# Patient Record
Sex: FEMALE | Race: WHITE | NOT HISPANIC OR LATINO
[De-identification: names, ages, dates, MRNs, and addresses within clinical notes are randomized per-mention and may not be internally consistent; named-entity substitution may affect disease eponyms.]

---

## 2017-01-13 ENCOUNTER — APPOINTMENT (OUTPATIENT)
Dept: NEUROLOGY | Facility: CLINIC | Age: 27
End: 2017-01-13

## 2017-01-20 ENCOUNTER — APPOINTMENT (OUTPATIENT)
Dept: NEUROLOGY | Facility: CLINIC | Age: 27
End: 2017-01-20

## 2017-04-05 ENCOUNTER — OUTPATIENT (OUTPATIENT)
Dept: OUTPATIENT SERVICES | Facility: HOSPITAL | Age: 27
LOS: 1 days | End: 2017-04-05
Payer: COMMERCIAL

## 2017-04-05 ENCOUNTER — APPOINTMENT (OUTPATIENT)
Dept: CV DIAGNOSITCS | Facility: HOSPITAL | Age: 27
End: 2017-04-05

## 2017-04-05 DIAGNOSIS — I10 ESSENTIAL (PRIMARY) HYPERTENSION: ICD-10-CM

## 2017-04-05 PROCEDURE — 93306 TTE W/DOPPLER COMPLETE: CPT

## 2017-04-05 PROCEDURE — 93306 TTE W/DOPPLER COMPLETE: CPT | Mod: 26

## 2017-04-10 ENCOUNTER — APPOINTMENT (OUTPATIENT)
Dept: CARDIOLOGY | Facility: CLINIC | Age: 27
End: 2017-04-10

## 2017-04-10 ENCOUNTER — NON-APPOINTMENT (OUTPATIENT)
Age: 27
End: 2017-04-10

## 2017-04-10 VITALS
HEART RATE: 97 BPM | DIASTOLIC BLOOD PRESSURE: 86 MMHG | SYSTOLIC BLOOD PRESSURE: 127 MMHG | WEIGHT: 169 LBS | HEIGHT: 67.5 IN | OXYGEN SATURATION: 98 % | BODY MASS INDEX: 26.22 KG/M2

## 2017-04-10 VITALS — DIASTOLIC BLOOD PRESSURE: 73 MMHG | OXYGEN SATURATION: 99 % | HEART RATE: 98 BPM | SYSTOLIC BLOOD PRESSURE: 105 MMHG

## 2017-04-10 DIAGNOSIS — M35.3 POLYMYALGIA RHEUMATICA: ICD-10-CM

## 2017-04-10 DIAGNOSIS — F41.8 OTHER SPECIFIED ANXIETY DISORDERS: ICD-10-CM

## 2017-04-10 DIAGNOSIS — Z87.891 PERSONAL HISTORY OF NICOTINE DEPENDENCE: ICD-10-CM

## 2017-04-10 DIAGNOSIS — A69.20 LYME DISEASE, UNSPECIFIED: ICD-10-CM

## 2017-04-10 DIAGNOSIS — Z00.00 ENCOUNTER FOR GENERAL ADULT MEDICAL EXAMINATION W/OUT ABNORMAL FINDINGS: ICD-10-CM

## 2017-04-10 DIAGNOSIS — R07.9 CHEST PAIN, UNSPECIFIED: ICD-10-CM

## 2017-04-10 RX ORDER — ADHESIVE TAPE 3"X 2.3 YD
50 MCG TAPE, NON-MEDICATED TOPICAL DAILY
Refills: 0 | Status: ACTIVE | COMMUNITY
Start: 2017-04-10

## 2017-04-10 RX ORDER — UBIDECARENONE 200 MG
200 CAPSULE ORAL
Refills: 0 | Status: ACTIVE | COMMUNITY
Start: 2017-04-10

## 2017-06-19 ENCOUNTER — EMERGENCY (EMERGENCY)
Facility: HOSPITAL | Age: 27
LOS: 1 days | Discharge: HOME | End: 2017-06-19
Attending: EMERGENCY MEDICINE | Admitting: EMERGENCY MEDICINE
Payer: COMMERCIAL

## 2017-06-19 VITALS
DIASTOLIC BLOOD PRESSURE: 81 MMHG | SYSTOLIC BLOOD PRESSURE: 127 MMHG | RESPIRATION RATE: 16 BRPM | TEMPERATURE: 98 F | HEART RATE: 70 BPM | OXYGEN SATURATION: 99 %

## 2017-06-19 VITALS
OXYGEN SATURATION: 100 % | HEART RATE: 120 BPM | DIASTOLIC BLOOD PRESSURE: 73 MMHG | SYSTOLIC BLOOD PRESSURE: 129 MMHG | TEMPERATURE: 98 F

## 2017-06-19 LAB
ALBUMIN SERPL ELPH-MCNC: 4.6 G/DL — SIGNIFICANT CHANGE UP (ref 3.3–5)
ALP SERPL-CCNC: 59 U/L — SIGNIFICANT CHANGE UP (ref 40–120)
ALT FLD-CCNC: 27 U/L RC — SIGNIFICANT CHANGE UP (ref 10–45)
ANION GAP SERPL CALC-SCNC: 18 MMOL/L — HIGH (ref 5–17)
APTT BLD: 22.5 SEC — LOW (ref 27.5–37.4)
AST SERPL-CCNC: 34 U/L — SIGNIFICANT CHANGE UP (ref 10–40)
BASOPHILS # BLD AUTO: 0 K/UL — SIGNIFICANT CHANGE UP (ref 0–0.2)
BASOPHILS NFR BLD AUTO: 0.3 % — SIGNIFICANT CHANGE UP (ref 0–2)
BILIRUB SERPL-MCNC: 0.7 MG/DL — SIGNIFICANT CHANGE UP (ref 0.2–1.2)
BUN SERPL-MCNC: 9 MG/DL — SIGNIFICANT CHANGE UP (ref 7–23)
CALCIUM SERPL-MCNC: 9.4 MG/DL — SIGNIFICANT CHANGE UP (ref 8.4–10.5)
CHLORIDE SERPL-SCNC: 99 MMOL/L — SIGNIFICANT CHANGE UP (ref 96–108)
CO2 SERPL-SCNC: 21 MMOL/L — LOW (ref 22–31)
CREAT SERPL-MCNC: 0.78 MG/DL — SIGNIFICANT CHANGE UP (ref 0.5–1.3)
D DIMER BLD IA.RAPID-MCNC: 656 NG/ML DDU — HIGH
EOSINOPHIL # BLD AUTO: 0.5 K/UL — SIGNIFICANT CHANGE UP (ref 0–0.5)
EOSINOPHIL NFR BLD AUTO: 4.5 % — SIGNIFICANT CHANGE UP (ref 0–6)
GLUCOSE SERPL-MCNC: 91 MG/DL — SIGNIFICANT CHANGE UP (ref 70–99)
HCG SERPL-ACNC: <2 MIU/ML — SIGNIFICANT CHANGE UP
HCT VFR BLD CALC: 41.5 % — SIGNIFICANT CHANGE UP (ref 34.5–45)
HGB BLD-MCNC: 13.9 G/DL — SIGNIFICANT CHANGE UP (ref 11.5–15.5)
INR BLD: 1.04 RATIO — SIGNIFICANT CHANGE UP (ref 0.88–1.16)
LYMPHOCYTES # BLD AUTO: 3.6 K/UL — HIGH (ref 1–3.3)
LYMPHOCYTES # BLD AUTO: 34.4 % — SIGNIFICANT CHANGE UP (ref 13–44)
MAGNESIUM SERPL-MCNC: 2.1 MG/DL — SIGNIFICANT CHANGE UP (ref 1.6–2.6)
MCHC RBC-ENTMCNC: 31.1 PG — SIGNIFICANT CHANGE UP (ref 27–34)
MCHC RBC-ENTMCNC: 33.5 GM/DL — SIGNIFICANT CHANGE UP (ref 32–36)
MCV RBC AUTO: 92.9 FL — SIGNIFICANT CHANGE UP (ref 80–100)
MONOCYTES # BLD AUTO: 1 K/UL — HIGH (ref 0–0.9)
MONOCYTES NFR BLD AUTO: 9.2 % — SIGNIFICANT CHANGE UP (ref 2–14)
NEUTROPHILS # BLD AUTO: 5.4 K/UL — SIGNIFICANT CHANGE UP (ref 1.8–7.4)
NEUTROPHILS NFR BLD AUTO: 51.6 % — SIGNIFICANT CHANGE UP (ref 43–77)
PHOSPHATE SERPL-MCNC: 2.8 MG/DL — SIGNIFICANT CHANGE UP (ref 2.5–4.5)
PLATELET # BLD AUTO: 285 K/UL — SIGNIFICANT CHANGE UP (ref 150–400)
POTASSIUM SERPL-MCNC: 4.1 MMOL/L — SIGNIFICANT CHANGE UP (ref 3.5–5.3)
POTASSIUM SERPL-SCNC: 4.1 MMOL/L — SIGNIFICANT CHANGE UP (ref 3.5–5.3)
PROT SERPL-MCNC: 8 G/DL — SIGNIFICANT CHANGE UP (ref 6–8.3)
PROTHROM AB SERPL-ACNC: 11.2 SEC — SIGNIFICANT CHANGE UP (ref 9.8–12.7)
RBC # BLD: 4.47 M/UL — SIGNIFICANT CHANGE UP (ref 3.8–5.2)
RBC # FLD: 11.9 % — SIGNIFICANT CHANGE UP (ref 10.3–14.5)
SODIUM SERPL-SCNC: 138 MMOL/L — SIGNIFICANT CHANGE UP (ref 135–145)
TROPONIN T SERPL-MCNC: <0.01 NG/ML — SIGNIFICANT CHANGE UP (ref 0–0.06)
TSH SERPL-MCNC: 3.48 UIU/ML — SIGNIFICANT CHANGE UP (ref 0.27–4.2)
WBC # BLD: 10.6 K/UL — HIGH (ref 3.8–10.5)
WBC # FLD AUTO: 10.6 K/UL — HIGH (ref 3.8–10.5)

## 2017-06-19 PROCEDURE — 82550 ASSAY OF CK (CPK): CPT

## 2017-06-19 PROCEDURE — 80053 COMPREHEN METABOLIC PANEL: CPT

## 2017-06-19 PROCEDURE — 83735 ASSAY OF MAGNESIUM: CPT

## 2017-06-19 PROCEDURE — 71275 CT ANGIOGRAPHY CHEST: CPT

## 2017-06-19 PROCEDURE — 71046 X-RAY EXAM CHEST 2 VIEWS: CPT

## 2017-06-19 PROCEDURE — 84702 CHORIONIC GONADOTROPIN TEST: CPT

## 2017-06-19 PROCEDURE — 85379 FIBRIN DEGRADATION QUANT: CPT

## 2017-06-19 PROCEDURE — 93010 ELECTROCARDIOGRAM REPORT: CPT

## 2017-06-19 PROCEDURE — 99284 EMERGENCY DEPT VISIT MOD MDM: CPT | Mod: 25

## 2017-06-19 PROCEDURE — 93970 EXTREMITY STUDY: CPT | Mod: 26

## 2017-06-19 PROCEDURE — 71275 CT ANGIOGRAPHY CHEST: CPT | Mod: 26

## 2017-06-19 PROCEDURE — 85730 THROMBOPLASTIN TIME PARTIAL: CPT

## 2017-06-19 PROCEDURE — 84100 ASSAY OF PHOSPHORUS: CPT

## 2017-06-19 PROCEDURE — 93970 EXTREMITY STUDY: CPT

## 2017-06-19 PROCEDURE — 99285 EMERGENCY DEPT VISIT HI MDM: CPT | Mod: 25

## 2017-06-19 PROCEDURE — 85027 COMPLETE CBC AUTOMATED: CPT

## 2017-06-19 PROCEDURE — 84443 ASSAY THYROID STIM HORMONE: CPT

## 2017-06-19 PROCEDURE — 71020: CPT | Mod: 26

## 2017-06-19 PROCEDURE — 84484 ASSAY OF TROPONIN QUANT: CPT

## 2017-06-19 PROCEDURE — 85610 PROTHROMBIN TIME: CPT

## 2017-06-19 PROCEDURE — 93005 ELECTROCARDIOGRAM TRACING: CPT

## 2017-06-19 RX ORDER — SODIUM CHLORIDE 9 MG/ML
1000 INJECTION INTRAMUSCULAR; INTRAVENOUS; SUBCUTANEOUS ONCE
Qty: 0 | Refills: 0 | Status: COMPLETED | OUTPATIENT
Start: 2017-06-19 | End: 2017-06-19

## 2017-06-19 RX ADMIN — SODIUM CHLORIDE 1000 MILLILITER(S): 9 INJECTION INTRAMUSCULAR; INTRAVENOUS; SUBCUTANEOUS at 12:03

## 2017-06-19 NOTE — ED PROVIDER NOTE - MUSCULOSKELETAL, MLM
Neck is supple, Spine appears normal, range of motion is not limited, no muscle or joint tenderness, radial and DP pulses 2+, No BLE edema or TTP of calfs bilaterally

## 2017-06-19 NOTE — ED ADULT NURSE NOTE - OBJECTIVE STATEMENT
26 y/o female presents to the ED ambulatory with steady gait. A&Ox3. C/O midline chest tightness. +throat pain, SOB, L leg pain (thigh to knee) that started this morning. Pt. reports driving 6 hours over the weekend and the pain started this morning. Pt. has chronic back pain and baseline numbness and tingling of fingers and toes. +clear lung sounds, easy work of breathing. pt. placed on cardiac monitor, sinus tachycardia. VSS. peripheral pulse present, cap refill<2 secs. bowel sounds present, abdomen soft, round, nondistended, and nontender. No edema present. Skin is dry, intact, warm to touch. Pt. denies chills, fever, cough, headache, palpitations, abdominal pain, back pain, neck pain, N/V/D. Safety maintained. Will continue to monitor. 28 y/o female presents to the ED ambulatory with steady gait. A&Ox3. C/O midline chest tightness. +throat pain, SOB, L leg pain (thigh to knee) that started this morning. Pt. reports driving 6 hours over the weekend and the pain started this morning. Pt. has chronic back pain and baseline numbness and tingling of fingers and toes. +clear lung sounds, easy work of breathing. pt. placed on cardiac monitor, sinus tachycardia. VSS. peripheral pulse present, cap refill<2 secs. bowel sounds present, abdomen soft, round, nondistended, and nontender. No edema present. Skin is dry, intact, warm to touch. full range of motion of upper and lower extremities.  Pt. denies chills, fever, cough, headache, palpitations, abdominal pain, back pain, neck pain, N/V/D. Safety maintained. Brother at bedside.  Will continue to monitor.

## 2017-06-19 NOTE — ED PROVIDER NOTE - MEDICAL DECISION MAKING DETAILS
26 y/o female with PMHx of Lyme Disease presents to the ED c/o CP and SOB after prolonged drive yesterday. Will obtain EKG, blood work with cardiaco enzyme, CTA for PE, and reassess. 28 y/o female with PMHx of Lyme Disease presents to the ED c/o CP and SOB after prolonged drive yesterday. Will obtain EKG, blood work with cardiaco enzyme, d-dimer for PE, and reassess.

## 2017-06-19 NOTE — ED ADULT NURSE REASSESSMENT NOTE - NS ED NURSE REASSESS COMMENT FT1
Pt. complained of midline chest pain. MD at the bedside. Pt. refused medication. MD order CT of chest. VSS. Pt. on cardiac monitor. Easy work of breathing. Will continue to monitor.

## 2017-06-19 NOTE — ED ADULT NURSE REASSESSMENT NOTE - NS ED NURSE REASSESS COMMENT FT1
Pt. is with family members at bedside. Pt. denies chest pain. Easy work of breathing. VSS. Awaiting CT scan. Will continue to monitor.

## 2017-06-19 NOTE — ED PROVIDER NOTE - CONSTITUTIONAL, MLM
normal... Well appearing, well nourished, awake, alert, oriented to person, place, time/situation and in no apparent distress. Well appearing, well nourished, awake, alert, oriented to person, place, time/situation.  Appears anxious

## 2017-06-19 NOTE — ED PROVIDER NOTE - PROGRESS NOTE DETAILS
Dr. Chandler (Attending Physician)  signed out to me. ultrasound negative for DVT. given follow-up echocardiogram if necessary. had echo 2 months ago that was reportedly normal by pmd. will dc home.

## 2017-06-19 NOTE — ED PROVIDER NOTE - OBJECTIVE STATEMENT
26 y/o female with PMHx of Lyme Disease and recent [place] Teeth Extraction presents to the ED c/o chest pain with SOB x1 day. Reports pain is pressure-like and intermittent.  Reports she was driving for 6 hours yesterday when she began to have leg cramping. Denies fever, chills, n/v/d.

## 2017-06-19 NOTE — ED PROVIDER NOTE - ATTENDING CONTRIBUTION TO CARE
****ATTENDING**** 26yo f hx lyme disease treated presents with chest pain, shortness of breath since yesterday. States she drove for 6hrs yesterday has b/l LE leg pain and cramps. Chronic back pain, no change. No numbness, tingling or weakness. Today patient had substernal chest pain and sob. States she has been drinking more alcohol recently and not hydrating. + marijuana. No other drug abuse. No family hx MI < 49yo. + URI recently that is improving. On exam, Patient is awake,alert,oriented x 3. Patient is well appearing and in no acute distress. Patient's chest is clear to ausculation, +s1s2. Abdomen is soft nd/nt +BS. Extremity with no swelling or calf tenderness. Back no midline tenderness. CN2-12 intact, 5/5 UE/LE nml sensation and coordination. Pt well appearing, neuro intact. Check Labs, D Dimer, Xray to eval for PE. Treat w IVF and re eval.

## 2017-06-30 ENCOUNTER — APPOINTMENT (OUTPATIENT)
Dept: CARDIOLOGY | Facility: CLINIC | Age: 27
End: 2017-06-30

## 2017-06-30 VITALS — HEART RATE: 95 BPM | SYSTOLIC BLOOD PRESSURE: 113 MMHG | OXYGEN SATURATION: 98 % | DIASTOLIC BLOOD PRESSURE: 69 MMHG

## 2017-06-30 VITALS — WEIGHT: 170 LBS | BODY MASS INDEX: 26.23 KG/M2

## 2017-07-05 LAB — DEPRECATED D DIMER PPP IA-ACNC: <150 NG/ML DDU

## 2017-07-12 ENCOUNTER — APPOINTMENT (OUTPATIENT)
Dept: VASCULAR SURGERY | Facility: CLINIC | Age: 27
End: 2017-07-12

## 2017-11-24 ENCOUNTER — TRANSCRIPTION ENCOUNTER (OUTPATIENT)
Age: 27
End: 2017-11-24

## 2018-03-01 ENCOUNTER — TRANSCRIPTION ENCOUNTER (OUTPATIENT)
Age: 28
End: 2018-03-01

## 2018-04-09 ENCOUNTER — EMERGENCY (EMERGENCY)
Facility: HOSPITAL | Age: 28
LOS: 1 days | Discharge: HOME | End: 2018-04-09
Attending: EMERGENCY MEDICINE
Payer: COMMERCIAL

## 2018-04-09 VITALS
OXYGEN SATURATION: 100 % | HEART RATE: 90 BPM | DIASTOLIC BLOOD PRESSURE: 59 MMHG | RESPIRATION RATE: 18 BRPM | SYSTOLIC BLOOD PRESSURE: 121 MMHG

## 2018-04-09 VITALS — OXYGEN SATURATION: 99 %

## 2018-04-09 PROCEDURE — 99285 EMERGENCY DEPT VISIT HI MDM: CPT | Mod: 25

## 2018-04-09 PROCEDURE — 96372 THER/PROPH/DIAG INJ SC/IM: CPT | Mod: XU

## 2018-04-09 PROCEDURE — 99291 CRITICAL CARE FIRST HOUR: CPT

## 2018-04-09 PROCEDURE — 93005 ELECTROCARDIOGRAM TRACING: CPT

## 2018-04-09 PROCEDURE — 96375 TX/PRO/DX INJ NEW DRUG ADDON: CPT

## 2018-04-09 PROCEDURE — 82962 GLUCOSE BLOOD TEST: CPT

## 2018-04-09 PROCEDURE — 96374 THER/PROPH/DIAG INJ IV PUSH: CPT

## 2018-04-09 PROCEDURE — 93010 ELECTROCARDIOGRAM REPORT: CPT | Mod: 59

## 2018-04-09 RX ORDER — FAMOTIDINE 10 MG/ML
20 INJECTION INTRAVENOUS ONCE
Qty: 0 | Refills: 0 | Status: COMPLETED | OUTPATIENT
Start: 2018-04-09 | End: 2018-04-09

## 2018-04-09 RX ORDER — SODIUM CHLORIDE 9 MG/ML
1000 INJECTION INTRAMUSCULAR; INTRAVENOUS; SUBCUTANEOUS ONCE
Qty: 0 | Refills: 0 | Status: COMPLETED | OUTPATIENT
Start: 2018-04-09 | End: 2018-04-09

## 2018-04-09 RX ORDER — EPINEPHRINE 0.3 MG/.3ML
0.3 INJECTION INTRAMUSCULAR; SUBCUTANEOUS ONCE
Qty: 0 | Refills: 0 | Status: COMPLETED | OUTPATIENT
Start: 2018-04-09 | End: 2018-04-09

## 2018-04-09 RX ORDER — DIPHENHYDRAMINE HCL 50 MG
50 CAPSULE ORAL ONCE
Qty: 0 | Refills: 0 | Status: COMPLETED | OUTPATIENT
Start: 2018-04-09 | End: 2018-04-09

## 2018-04-09 RX ORDER — EPINEPHRINE 0.3 MG/.3ML
0.3 INJECTION INTRAMUSCULAR; SUBCUTANEOUS
Qty: 4 | Refills: 0 | OUTPATIENT
Start: 2018-04-09 | End: 2018-04-09

## 2018-04-09 RX ADMIN — SODIUM CHLORIDE 1000 MILLILITER(S): 9 INJECTION INTRAMUSCULAR; INTRAVENOUS; SUBCUTANEOUS at 16:44

## 2018-04-09 RX ADMIN — Medication 50 MILLIGRAM(S): at 16:43

## 2018-04-09 RX ADMIN — FAMOTIDINE 20 MILLIGRAM(S): 10 INJECTION INTRAVENOUS at 16:43

## 2018-04-09 RX ADMIN — EPINEPHRINE 0.3 MILLIGRAM(S): 0.3 INJECTION INTRAMUSCULAR; SUBCUTANEOUS at 16:35

## 2018-04-09 RX ADMIN — Medication 125 MILLIGRAM(S): at 16:43

## 2018-04-09 NOTE — ED PROVIDER NOTE - PLAN OF CARE
Follow up with your PMD within 48-72 hours.  Recommend follow up with your Allergist. Referral list provided.  Take Prednisone 40mg daily for 4 days, Pepcid 20mg 2x/day for 4 days, Benadryl 25mg every 8 hours for 4 days- caution drowsiness/do not drive. Worsening or new shortness of breath, tightness in your throat, swelling, chest pain, fever, chills, return to the ER.

## 2018-04-09 NOTE — ED ADULT NURSE REASSESSMENT NOTE - NS ED NURSE REASSESS COMMENT FT1
Pt. eating dinner. Mom at bedside. Patient aware that she will be observed until 2200 and then be evaluated for discharge. Vital signs stale, and patient's tongue is no longer swollen. Patient is able to swallow without difficulty.

## 2018-04-09 NOTE — ED ADULT NURSE NOTE - OBJECTIVE STATEMENT
Patient allergic to tree nuts and presents with generalized itching and tongue swelling. Patient placed on cardiac monitor and MD at bedside on arrival in ER.

## 2018-04-09 NOTE — ED PROVIDER NOTE - ATTENDING CONTRIBUTION TO CARE
Attending Note (Anny): patient presents with tongue swelling and change in voice.  complaining of throat itching. no wheezing or stridor on initial exam.  history of anaphylaxis.  treated for anaphylaxis.  patient with focal lower lip and tongue swelling, no uvula edema.  concern for anaphylaxis vs angioedema.  will monitor in ED

## 2018-04-09 NOTE — ED PROVIDER NOTE - PHYSICAL EXAMINATION
AAOX3, NCAT, PERRL, no injection, +tongue swelling, uvula midline no edema, tolerating secretions, airway patent. Neck Supple, neck without swelling or anterior tendernss, HR regular, tachycardic, clear lungs unlabored breathing, ABD S/NT/ND No Rash,  MAEx4, normal tone.

## 2018-04-09 NOTE — ED PROVIDER NOTE - CARE PLAN
Assessment and plan of treatment:	Follow up with your PMD within 48-72 hours.  Recommend follow up with your Allergist. Referral list provided.  Take Prednisone 40mg daily for 4 days, Pepcid 20mg 2x/day for 4 days, Benadryl 25mg every 8 hours for 4 days- caution drowsiness/do not drive. Worsening or new shortness of breath, tightness in your throat, swelling, chest pain, fever, chills, return to the ER. Principal Discharge DX:	Angio-edema  Assessment and plan of treatment:	Follow up with your PMD within 48-72 hours.  Recommend follow up with your Allergist. Referral list provided.  Take Prednisone 40mg daily for 4 days, Pepcid 20mg 2x/day for 4 days, Benadryl 25mg every 8 hours for 4 days- caution drowsiness/do not drive. Worsening or new shortness of breath, tightness in your throat, swelling, chest pain, fever, chills, return to the ER.

## 2018-04-09 NOTE — ED PROVIDER NOTE - CRITICAL CARE PROVIDED
additional history taking/direct patient care (not related to procedure)/documentation/consult w/ pt's family directly relating to pts condition/interpretation of diagnostic studies

## 2018-04-09 NOTE — ED PROVIDER NOTE - PROGRESS NOTE DETAILS
tongue swelling and lip swelling improved.  normal speech now. Attending Note (Anny): tongue swelling and lip swelling improved.  normal speech now. Attending Note (Anny): ambulatory without distress SOPHIE: Feels well. Tolerating po.

## 2018-04-09 NOTE — ED PROVIDER NOTE - OBJECTIVE STATEMENT
26 yo F pmhx of tree and penicillin allergy, last anaphylaxis requiring epi 3 years ago presenting with tongue swelling and itching that began after eating a corn muffin approx. 10 min prior to arrival (16:00). No hives, nausea, vomiting, diarrhea, constipation, abdominal pain, no sob, +tongue swelling, able to swallow. No eye watering or itching.

## 2018-04-28 NOTE — ED ADULT NURSE REASSESSMENT NOTE - NS ED NURSE REASSESS COMMENT FT1
[address] Now    NAME: Kelsie Muñoz is a 78 y o  female  : 1931    MRN: [id number]  DATE: 2018  TIME: 5:58 PM    Assessment and Plan   Weakness generalized [R53 1]  1  Weakness generalized           Patient Instructions   Recommended patient to go to [place] [address] Emergency department for further evaluation  Offered to call an ambulance but the family decline  Patients daughter-in-law to drive her  Chief Complaint     Chief Complaint   Patient presents with    Fatigue     pt does have a history of depression  c/o symptoms yesterday morning  Pt is eating well with no n/v  No fevers  As stated by caregiver pt blood pressures have been very good   Dizziness         History of Present Illness       77 y/o female accompanied by daughter-in-law for weakness, headache, SOB, and dizziness for 1 week  The patient is non-english speaking with family at bedside to translate  I spoke with her daughter Edu Castaneda who lives in [place] on the phone who reports that patient has progressive weakness, history of low sodium,irregular heart rhythm, and frequent UTIs  The patient has 24/7 care at home  Patients daughter was under the assumption that she can have a full evaluation with labs completed in urgent care  Patient does appear anxious but is not in acute distress  Fatigue   Associated symptoms include headaches and weakness  Pertinent negatives include no abdominal pain, chills, congestion, coughing, fatigue, fever, nausea or vomiting  Dizziness   Associated symptoms include headaches and weakness  Pertinent negatives include no abdominal pain, chills, congestion, coughing, fatigue, fever, nausea or vomiting  Review of Systems   Review of Systems   Constitutional: Negative for activity change, chills, fatigue and fever  HENT: Negative for congestion  Respiratory: Negative for cough  Gastrointestinal: Negative for abdominal pain, diarrhea, nausea and vomiting     Neurological: Positive for dizziness, weakness and headaches  Negative for facial asymmetry  Current Medications       Current Outpatient Prescriptions:     ALPRAZolam (XANAX) 0 25 mg tablet, TK 1 T PO NIGHTLY PRA OR SLEEP, Disp: , Rfl: 0    aspirin 81 MG tablet, Take 325 mg by mouth daily  , Disp: , Rfl:     fluPHENAZine (PROLIXIN) 5 mg tablet, Take 2 5 mg by mouth daily, Disp: , Rfl:     LORazepam (ATIVAN) 0 5 mg tablet, Take 0 5 mg by mouth every 6 (six) hours as needed for anxiety, Disp: , Rfl:     metoprolol tartrate (LOPRESSOR) 25 mg tablet, Take 25 mg by mouth 2 (two) times a day, Disp: , Rfl:     omeprazole (PriLOSEC) 20 mg delayed release capsule, Take 1 capsule by mouth daily for 30 days, Disp: 30 capsule, Rfl: 0    Current Allergies     Allergies as of 04/28/2018    (No Known Allergies)            The following portions of the patient's history were reviewed and updated as appropriate: allergies, current medications, past family history, past medical history, past social history, past surgical history and problem list      Past Medical History:   Diagnosis Date    Anxiety     Depression     Hypertension     Palpitations     last assessed: 11/04/15    Skin lesion     last assessed: 07/27/16    Stomach ache     Wheezing     Chronic       Past Surgical History:   Procedure Laterality Date    FEMUR SURGERY Right     TOTAL HIP ARTHROPLASTY Left 2007    TOTAL HIP ARTHROPLASTY Right        Family History   Problem Relation Age of Onset    Tuberculosis Father          Medications have been verified  Objective   /84 (BP Location: Left arm, Patient Position: Sitting, Cuff Size: Standard)   Pulse 80   Temp 98 °F (36 7 °C) (Temporal)   Resp 20   Ht 5' (1 524 m)   Wt 54 4 kg (120 lb)   SpO2 95%   BMI 23 44 kg/m²        Physical Exam     Physical Exam   Constitutional: She appears well-developed and well-nourished  HENT:   Head: Normocephalic     Cardiovascular: Normal rate and normal Pt. reports feeling better. Pt. denies chest pain. Brother and sister at the bedside. Awaiting CT. Easy work of breathing. Will continue to monitor. pulses  Murmur heard  Pulmonary/Chest: Effort normal and breath sounds normal  No respiratory distress  She has no wheezes  She has no rales  Neurological: She is alert  Skin: Skin is warm and dry  She is not diaphoretic  Psychiatric: Her mood appears anxious

## 2018-07-05 ENCOUNTER — EMERGENCY (EMERGENCY)
Facility: HOSPITAL | Age: 28
LOS: 1 days | Discharge: HOME | End: 2018-07-05
Attending: EMERGENCY MEDICINE
Payer: COMMERCIAL

## 2018-07-05 VITALS
SYSTOLIC BLOOD PRESSURE: 122 MMHG | HEART RATE: 93 BPM | HEIGHT: 68 IN | DIASTOLIC BLOOD PRESSURE: 78 MMHG | RESPIRATION RATE: 17 BRPM | TEMPERATURE: 98 F | WEIGHT: 151.9 LBS | OXYGEN SATURATION: 98 %

## 2018-07-05 PROCEDURE — 99283 EMERGENCY DEPT VISIT LOW MDM: CPT

## 2018-07-05 PROCEDURE — 99283 EMERGENCY DEPT VISIT LOW MDM: CPT | Mod: 25

## 2018-07-05 RX ADMIN — Medication 50 MILLIGRAM(S): at 23:53

## 2018-07-05 NOTE — ED PROVIDER NOTE - MEDICAL DECISION MAKING DETAILS
29 yo F w/ previous allergic reactions presenting s/p allergic reaction and s/p Benadryl prior to arrival. Currently asymptomatic in ED. No complaints. Physical exam unremarkable. Will treat with prednisone and observe for 6 hours post event. Reassess.

## 2018-07-05 NOTE — ED ADULT NURSE NOTE - OBJECTIVE STATEMENT
29 yo f reporting to ED for allergic reaction. PMH of lyme disease & anxiety. Pt reporting that she has a known allergy to tree nuts. Pt reports that she ate japanese food that may have contained tree nuts. Pt reports she began to develop rash on face and chest. Pt reports "I took two pills of benadryl, I'm not sure what dose, and rushed here". Airway patent. NO swelling of lips, tongue, or face. No SOB. No difficulty breathing. No difficulty swallowing. No respiratory distress. No wheezes. Pt AAOx3, NAD, lungs clear bilat, abdomen soft nontender nondistended, strong peripheral pulses x 4, cap refill < 2 seconds, skin warm and dry. Pt denies headache, dizziness, chest pain, palpitations, cough, SOB, abdominal pain, n/v/d, urinary symptoms, fevers, chills, weakness at this time. 20 gauge established in the RAC. Brother at the bedside. Will continue to reassess.

## 2018-07-05 NOTE — ED PROVIDER NOTE - ATTENDING CONTRIBUTION TO CARE
28 yof no pmhx besides prior anaphylaxis to tree nuts (has epi pen) no intubations or airway compromise prev, presents this evening after having swelling of hands and mild swelling to lips with diffuse rash approx 30 min after eating gyoza at a chinese food resstaurant. episode happened at 0930. states eats at same restaurant and has ordered same thing in past - to her knowledge no nuts were used but is unsure. took benadryl 50 mg at home with relief. no change in voice, no throat swelling, no sob, no abd pain. pt states is feeling much better w resolved rash in ED however still feels that hands are mildly swollen.     ROS:   constitutional - no fever, no chills  eyes - no visual changes, no redness  eent - no sore throat, no nasal congestion  cvs - no chest pain, no leg swelling  resp - no shortness of breath, no cough  gi - no abdominal pain, no vomiting, no diarrhea  gu - no dysuria, no hematuria  msk - no acute back pain, no joint swelling  skin - + rashes, no jaundice  neuro - no headache, no focal weakness    Physical Exam:   constitutional - well appearing, awake and alert, oriented x3  head - no external evidence of trauma  ent - no uvular edema or angioedema  cvs - rrr, no murmurs, no peripheral edema  resp - breath sounds clear and equal bilat  gi - abdomen soft and nontender, no rigidity, guarding or rebound, bowel sounds present  msk - moving all extremities spontaneously  neuro - alert and oriented x3, no focal deficits, CNs 2-12 grossly intact  skin- no jaundice, warm and dry. no visible rashes. palms bilat appear midly erythematous.   psych - mood and affect wnl, no apparent risk to self or others   psych - no acute mental health issue    likely allergic rx already resolving after benadryl at home. will add steroids. pt has rx for epi pen and has pen in her pocket that she did not use this evening. observed until 5-6 hrs after time of sxs onset without recurrence, pt feeling well, sxs totally resolved upon dc. ? whether possibly new ingredients or nut oils were used at restaurant. additional verbal instructions regarding diagnosis, return precautions and follow up plan given to pt and/or family. LILY Mosqueda MD

## 2018-07-05 NOTE — ED PROVIDER NOTE - PLAN OF CARE
Please follow up with a primary care provider as soon as possible. If you do not have a primary care doctor, you can make an appointment with one at the following location.   [facility] at [place]  Phone: [phone]  Fax: [phone]  Address: [address] [place] [address], [facility] [address], [place] [address]    Additionally, you may use the following web address to find a [facility] physician close to you: [email or web address]   Please return to the emergency department if you experience worsening symptoms including hives, itching, wheezing, upset stomach, facial swelling, or if you develop headache, neck stiffness, fever/chills, changes in vision, chest pain, shortness of breath, difficulty breathing, palpitations, lightheadedness, weakness, dizziness, numbness, tingling, abdominal pain, nausea, vomiting, diarrhea, changes in your urine, blood in the urine, painful urination, syncope (passing out), or for any other concerns.

## 2018-07-05 NOTE — ED PROVIDER NOTE - CARE PLAN
Principal Discharge DX:	Allergic reaction  Assessment and plan of treatment:	Please follow up with a primary care provider as soon as possible. If you do not have a primary care doctor, you can make an appointment with one at the following location.   [facility] at [place]  Phone: [phone]  Fax: [phone]  Address: [address] [place] [address], [place] [address], [place] [address]    Additionally, you may use the following web address to find a [facility] physician close to you: [email or web address]   Please return to the emergency department if you experience worsening symptoms including hives, itching, wheezing, upset stomach, facial swelling, or if you develop headache, neck stiffness, fever/chills, changes in vision, chest pain, shortness of breath, difficulty breathing, palpitations, lightheadedness, weakness, dizziness, numbness, tingling, abdominal pain, nausea, vomiting, diarrhea, changes in your urine, blood in the urine, painful urination, syncope (passing out), or for any other concerns.

## 2018-07-05 NOTE — ED PROVIDER NOTE - OBJECTIVE STATEMENT
27 yo F w/ PMH of allergic reaction to tree nuts presenting to ED after allergic reaction. Was eating Chinese food around 9:30pm when had hives, swelling of skin, mild swelling of lip. Took 50mg of Benadryl and came to ED. Feeling much better at this time. Denies wheezing, lightheadedness, n/v/d/c, current hives, angioedema. Endorses mild pruritus.

## 2018-07-05 NOTE — ED ADULT NURSE NOTE - CHPI ED SYMPTOMS NEG
no cough/no wheezing/no difficulty breathing/no difficulty swallowing/no throat itching/no vomiting/no nausea/no shortness of breath/no swelling of face, tongue

## 2018-07-06 VITALS
RESPIRATION RATE: 16 BRPM | TEMPERATURE: 98 F | DIASTOLIC BLOOD PRESSURE: 71 MMHG | OXYGEN SATURATION: 100 % | HEART RATE: 75 BPM | SYSTOLIC BLOOD PRESSURE: 104 MMHG

## 2018-07-06 NOTE — ED ADULT NURSE REASSESSMENT NOTE - NS ED NURSE REASSESS COMMENT FT1
Pt AAOx4, NAD, resting comfortably in bed with brother at bedside. Pt denies headache, dizziness, chest pain, cough, SOB, abdominal pain, n/v/d, urinary symptoms, fevers, chills, weakness at this time. No swelling of lips/tongue/face. Pt verbalized understanding to avoid tree nuts and have epi pen on hand at all times. Pt discharged as per MD, IV removed as per MD, pt ambulated independently out of ED.

## 2018-07-06 NOTE — ED ADULT NURSE REASSESSMENT NOTE - NS ED NURSE REASSESS COMMENT FT1
01:25. VSS. NAD. Airway patent. Respirations spontaneous, non-labored. No CP or SOB. Rash has decreased. No swelling of lips/tongue/face. No N/V/D, Safety & comfort maintained. Will continue to reassess.

## 2018-07-06 NOTE — ED ADULT NURSE REASSESSMENT NOTE - NS ED NURSE REASSESS COMMENT FT1
Pt AAOx4, NAD, resp nonlabored, resting comfortably in bed with family at bedside. Airway patent. No swelling of lips/tongue/mouth. No SOB. No difficulty breathing or swallowing. Pt denies headache, dizziness, chest pain, palpitations, SOB, abd pain, n/v/d, urinary symptoms, fevers, chills, weakness at this time. Safety maintained.

## 2018-12-16 ENCOUNTER — TRANSCRIPTION ENCOUNTER (OUTPATIENT)
Age: 28
End: 2018-12-16

## 2019-03-16 NOTE — ED PROVIDER NOTE - CPE EDP EYES NORM
Patient Seen in: THE [facility] Emergency Department In [place]    History   Patient presents with:  Head Neck Injury (neurologic, musculoskeletal)  Laceration Abrasion (integumentary)    Stated Complaint: head injury with laceration while playing rugby today deformity. There is a small superficial abrasion adjacent to the laceration. Eyes: Conjunctivae and EOM are normal. Pupils are equal, round, and reactive to light. Neck: Normal range of motion. Neck supple.    No midline tenderness or step-off deformit normal...

## 2019-06-27 ENCOUNTER — TRANSCRIPTION ENCOUNTER (OUTPATIENT)
Age: 29
End: 2019-06-27

## 2022-04-19 NOTE — ED ADULT NURSE NOTE - PRIMARY CARE PROVIDER
What Type Of Note Output Would You Prefer (Optional)?: Standard Output
Hpi Title: Evaluation of Skin Lesions
How Severe Are Your Spot(S)?: mild
Have Your Spot(S) Been Treated In The Past?: has not been treated
unknown

## 2023-04-18 ENCOUNTER — NON-APPOINTMENT (OUTPATIENT)
Age: 33
End: 2023-04-18

## 2024-02-01 ENCOUNTER — APPOINTMENT (OUTPATIENT)
Dept: OPHTHALMOLOGY | Facility: CLINIC | Age: 34
End: 2024-02-01

## 2024-02-29 ENCOUNTER — NON-APPOINTMENT (OUTPATIENT)
Age: 34
End: 2024-02-29

## 2024-02-29 ENCOUNTER — APPOINTMENT (OUTPATIENT)
Dept: OPHTHALMOLOGY | Facility: CLINIC | Age: 34
End: 2024-02-29
Payer: COMMERCIAL

## 2024-02-29 PROCEDURE — 92004 COMPRE OPH EXAM NEW PT 1/>: CPT

## 2024-02-29 PROCEDURE — 92060 SENSORIMOTOR EXAMINATION: CPT

## 2024-07-29 NOTE — ED PROVIDER NOTE - NSCAREINITIATED _GEN_ER
For information on Fall & Injury Prevention, visit: [email or web address] OR  [email or web address] OR  https://www.cdc.gov/steadi/patient.html
Abhishek Silva(Resident)

## 2025-04-08 ENCOUNTER — NON-APPOINTMENT (OUTPATIENT)
Age: 35
End: 2025-04-08

## 2025-04-08 ENCOUNTER — APPOINTMENT (OUTPATIENT)
Dept: NEUROLOGY | Facility: CLINIC | Age: 35
End: 2025-04-08
Payer: COMMERCIAL

## 2025-04-08 VITALS
BODY MASS INDEX: 26.37 KG/M2 | RESPIRATION RATE: 16 BRPM | SYSTOLIC BLOOD PRESSURE: 125 MMHG | HEIGHT: 67.5 IN | DIASTOLIC BLOOD PRESSURE: 72 MMHG | HEART RATE: 115 BPM | OXYGEN SATURATION: 96 % | WEIGHT: 170 LBS

## 2025-04-08 DIAGNOSIS — R42 DIZZINESS AND GIDDINESS: ICD-10-CM

## 2025-04-08 DIAGNOSIS — F41.8 OTHER SPECIFIED ANXIETY DISORDERS: ICD-10-CM

## 2025-04-08 PROCEDURE — 99205 OFFICE O/P NEW HI 60 MIN: CPT

## 2025-04-08 PROCEDURE — G2211 COMPLEX E/M VISIT ADD ON: CPT | Mod: NC

## 2025-04-08 RX ORDER — PINDOLOL 5 MG/1
TABLET ORAL
Refills: 0 | Status: ACTIVE | COMMUNITY

## 2025-04-08 RX ORDER — METHOCARBAMOL 500 MG/1
500 TABLET, FILM COATED ORAL
Refills: 0 | Status: ACTIVE | COMMUNITY

## 2025-04-08 RX ORDER — ACETAMINOPHEN 500 MG
500 TABLET ORAL
Refills: 0 | Status: ACTIVE | COMMUNITY

## 2025-04-08 RX ORDER — IBUPROFEN 200 MG/1
200 TABLET, COATED ORAL
Refills: 0 | Status: ACTIVE | COMMUNITY

## 2025-04-14 NOTE — ED ADULT NURSE NOTE - NS ED NURSE DC TEACHING
I was asked to review ptosis visual fields for upper eyelid surgery preauthorization. Standard tangent screen visual fields were obtained.    My findings are as follows:    VISUAL FIELD:  Right eye untaped:19 degrees Right eye taped:51 degrees  Left eye untaped:18 degrees Left eye taped:52 degrees    Right eye visual field improves by: 32 degrees  Left eye visual field improves by: 34 degrees    Vidal Rush MD  Director, Ophthalmic Plastic and Reconstructive Surgery  Departments of Ophthalmology and Otolaryngology  [facility]      Medications

## 2025-06-29 ENCOUNTER — NON-APPOINTMENT (OUTPATIENT)
Age: 35
End: 2025-06-29